# Patient Record
Sex: FEMALE | Race: WHITE | NOT HISPANIC OR LATINO | Employment: FULL TIME | URBAN - METROPOLITAN AREA
[De-identification: names, ages, dates, MRNs, and addresses within clinical notes are randomized per-mention and may not be internally consistent; named-entity substitution may affect disease eponyms.]

---

## 2019-04-22 ENCOUNTER — DOCUMENTATION (OUTPATIENT)
Dept: BEHAVIORAL HEALTH | Facility: CLINIC | Age: 49
End: 2019-04-22

## 2019-04-24 ENCOUNTER — HOSPITAL ENCOUNTER (OUTPATIENT)
Dept: BEHAVIORAL HEALTH | Facility: MEDICAL CENTER | Age: 49
End: 2019-04-24
Attending: PSYCHIATRY & NEUROLOGY
Payer: COMMERCIAL

## 2019-04-24 DIAGNOSIS — F14.980: ICD-10-CM

## 2019-04-24 PROCEDURE — 90791 PSYCH DIAGNOSTIC EVALUATION: CPT

## 2019-04-24 RX ORDER — LEVOTHYROXINE SODIUM 137 UG/1
137 TABLET ORAL
COMMUNITY

## 2019-04-24 RX ORDER — FLUOXETINE HYDROCHLORIDE 20 MG/1
20 CAPSULE ORAL DAILY
COMMUNITY

## 2019-04-24 NOTE — PROGRESS NOTES
I have reviewed the note by Tricia Baker RN and agree with the assessment and treatment plan.    1. Admit to Intensive Outpatient Program on 4/29/19   - Group therapy per schedule,    - Psychoeducational groups per schedule,   - Individual/family counseling sessions with  per treatment plan.  2. Symptoms necessitating Intensive Outpatient Treatment: excessive cocaine use  3. Medical screening/Physical exam per primary care provider or referring facility.
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
## 2019-04-29 ENCOUNTER — HOSPITAL ENCOUNTER (OUTPATIENT)
Dept: BEHAVIORAL HEALTH | Facility: MEDICAL CENTER | Age: 49
End: 2019-04-29
Attending: PSYCHIATRY & NEUROLOGY
Payer: COMMERCIAL

## 2019-04-29 ENCOUNTER — TELEPHONE (OUTPATIENT)
Dept: BEHAVIORAL HEALTH | Facility: MEDICAL CENTER | Age: 49
End: 2019-04-29

## 2019-04-29 DIAGNOSIS — F14.20 COCAINE DEPENDENCE, CONTINUOUS (HCC): ICD-10-CM

## 2019-04-29 DIAGNOSIS — F14.10 COCAINE ABUSE (HCC): ICD-10-CM

## 2019-04-29 PROCEDURE — 90834 PSYTX W PT 45 MINUTES: CPT

## 2019-04-29 PROCEDURE — 90853 GROUP PSYCHOTHERAPY: CPT | Performed by: MARRIAGE & FAMILY THERAPIST

## 2019-04-29 NOTE — BH THERAPY
Group Therapy Checklist  Attendance: Attended  Attendance Duration (min):  (60)  Number of Participants: 11  Program/Group: Intensive Outpatient Program  Topics Covered: Steps 1/2/3  Participation: Limited verbal participation, Attentive  Affect/Mood Range: Flexible  Affect/Mood Display: Congruent w/content  Cognition: Oriented, Alert  Evidence of Imminent Suicide Risk: No  Evidence of imminent homicide risk: No  Therapeutic Interventions: Psychoeducation re: (Comment), Cognitive clarification  Progress Toward Treatment Goal: Moderate improvement

## 2019-04-29 NOTE — BH THERAPY
Group Therapy Checklist  Attendance: Attended  Attendance Duration (min):  (90)  Number of Participants: 11  Program/Group: Intensive Outpatient Program  Topics Covered:  (Process Group )  Participation: Limited verbal participation, Guarded/resistant. Rahul seemed engaged in therapy even though she had limited verbal participation.   Affect/Mood Range: Constricted  Affect/Mood Display: Congruent w/content  Cognition: Alert  Evidence of Imminent Suicide Risk: No  Evidence of imminent homicide risk: No  Therapeutic Interventions: Emotion clarification, Cognitive clarification, Supportive psychotherapy  Progress Toward Treatment Goal: Mild improvement

## 2019-04-29 NOTE — TELEPHONE ENCOUNTER
[facility]  TRANSFER/DISCHARGE SUMMARY FORM    HHPI / SCP:  Other Ins.: Mary     Patient Name: Rahul BENSON  Admission Date: 19  Level of Care Attended:  Intens.OP : 1970  Transfer/Discharge Date: MRN: [id number]  19       SIGNIFICANT FINDINGS/CLINICAL IMPRESSION:   DSM Codes:   Cocaine addiction    ICD10 Codes: F14.10  No diagnosis found.    Additional problems identified via assessment: n/a    Treatment Components in Which Patient Participated (check all that apply):  Education group(s), 1:1 teaching/therapy and Group Therapy    Summary of Course of Treatment: She attended 1 day of IOP and decided she could not afford to take time off work so will not be attending after today.    Condition at Time of Transfer/Discharge: Case administratively closed due to Decided she could not afford to take time off work    [] Medications Reviewed with Copy to Patient    Referred to: Refer to [facility]: Outpatient Therapy     Patient is in agreement with discharge plan: yes    Nneka Chen R.N.

## 2019-04-29 NOTE — CARE PLAN
Problem: Substance Induced Symptoms  Goal: Abstinence    Intervention: Individual Counseling Sessions   [facility]  Therapy Progress Note    Patient Name: Rahul BENSON  Patient MRN: [id number]  Today's Date: 4/29/2019     Type of session:Individual psychotherapy  Length of session: 45 minutes  Persons in attendance:Patient    Subjective/New Info: She forgot to do her assessments so we discussed how she is doing.  She used and drank last week on Wednesday after her intake and got paranoid and felt terrible afterwards.  She is committed to staying clean and sober and has been going almost daily to AA or NA since then.  Her  has also quit drinking and is supporting her and willing to go to meetings with her. Most of her friends drink and use but she has a few that have stopped.  She is having more pain in her back and feet since stopping drugs and is also having a hard time sleeping.  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    Objective/Observations:   Participation: Active verbal participation, Attentive, Engaged and Open to feedback   Grooming: Casual and Neat   Cognition: Alert and Fully Oriented   Eye contact: Good   Mood: Euthymic   Affect: Flexible and Full range   Thought process: Logical and Goal-directed   Speech: Rate within normal limits and Volume within normal limits   Other:     Diagnoses: No diagnosis found. cocaine use disorder    Current risk:   SUICIDE: Not applicable   Homicide: Not applicable   Self-harm: Not applicable   Relapse: Moderate   Other:    Safety Plan reviewed? Not Indicated   If evidence of imminent risk is present, intervention/plan:     Therapeutic Intervention(s): Develop/modify treatment plan, Establish rapport, Goal-setting, Maladaptive behavior addressed, Parenting/familial roles addressed, Positive behavior reinforced, Self-care skills and  "Treatment compliance addressed    Treatment Goal(s)/Objective(s) addressed:  met to discuss her goals and develop a treatment plan     Progress toward Treatment Goals: Mild improvement    Plan:  - \"Homework\" recommendation: complete assessments    Coral SULMA Chen R.N.  4/29/2019                                       "

## 2019-04-30 ENCOUNTER — OFFICE VISIT (OUTPATIENT)
Dept: BEHAVIORAL HEALTH | Facility: CLINIC | Age: 49
End: 2019-04-30
Payer: COMMERCIAL

## 2019-04-30 DIAGNOSIS — F14.11 COCAINE ABUSE, IN REMISSION (HCC): ICD-10-CM

## 2019-04-30 DIAGNOSIS — F41.9 ANXIOUSNESS: ICD-10-CM

## 2019-04-30 DIAGNOSIS — F15.11: ICD-10-CM

## 2019-04-30 DIAGNOSIS — F10.10 ALCOHOL ABUSE: ICD-10-CM

## 2019-04-30 DIAGNOSIS — F33.1 MAJOR DEPRESSIVE DISORDER, RECURRENT EPISODE, MODERATE (HCC): ICD-10-CM

## 2019-04-30 DIAGNOSIS — F12.10 CANNABIS ABUSE: ICD-10-CM

## 2019-04-30 PROCEDURE — 90834 PSYTX W PT 45 MINUTES: CPT | Performed by: PSYCHOLOGIST

## 2019-05-15 PROBLEM — F12.10 CANNABIS ABUSE: Status: ACTIVE | Noted: 2019-05-15

## 2019-05-15 PROBLEM — F41.9 ANXIOUSNESS: Status: ACTIVE | Noted: 2019-05-15

## 2019-05-15 PROBLEM — F14.11 COCAINE ABUSE, IN REMISSION (HCC): Status: ACTIVE | Noted: 2019-05-15

## 2019-05-15 PROBLEM — F10.10 ALCOHOL ABUSE: Status: ACTIVE | Noted: 2019-05-15

## 2019-05-15 PROBLEM — F15.11: Status: ACTIVE | Noted: 2019-05-15

## 2019-05-15 PROBLEM — F33.1 MAJOR DEPRESSIVE DISORDER, RECURRENT EPISODE, MODERATE (HCC): Status: ACTIVE | Noted: 2019-05-15

## 2019-05-15 PROBLEM — F12.11 H/O CANNABIS ABUSE: Status: ACTIVE | Noted: 2019-05-15
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
## 2019-05-16 ENCOUNTER — OFFICE VISIT (OUTPATIENT)
Dept: BEHAVIORAL HEALTH | Facility: CLINIC | Age: 49
End: 2019-05-16
Payer: COMMERCIAL

## 2019-05-16 DIAGNOSIS — F10.10 ALCOHOL ABUSE: ICD-10-CM

## 2019-05-16 DIAGNOSIS — F33.1 MAJOR DEPRESSIVE DISORDER, RECURRENT EPISODE, MODERATE (HCC): ICD-10-CM

## 2019-05-16 DIAGNOSIS — F14.10 COCAINE ABUSE, EPISODIC (HCC): ICD-10-CM

## 2019-05-16 PROCEDURE — 90834 PSYTX W PT 45 MINUTES: CPT | Performed by: PSYCHOLOGIST

## 2019-05-31 PROBLEM — F14.10 COCAINE ABUSE, EPISODIC (HCC): Status: ACTIVE | Noted: 2019-05-15

## 2019-05-31 NOTE — BH THERAPY
[facility]  Therapy Progress Note    Patient Name: Rahul BENSON  Patient MRN: [id number]  Today's Date: 5/16/2019     Type of session:Individual psychotherapy  Length of session: 50 minutes  Persons in attendance:Patient    Subjective/New Info: Patient reports that after hanging out with her friend, who continues to relapse, patient also relapsed on alcohol and cocaine.  Patient is beginning to come to an understanding that she can no longer hang out with friends who use and has decided to put it into this friendship.  Discussed how patient might talk about this with her friend.  Patient identifies cocaine as her main drug of choice but now understands that every time she drinks she relapses on cocaine too or other drugs.  Discussed some relapse prevention strategies that might help patient remain clean and sober including replacement activities.  Patient has a lot of shame and guilt every time she relapses.    Objective/Observations:   Participation: Active verbal participation, Attentive and Engaged   Grooming: Casual   Cognition: Alert and Fully Oriented   Eye contact: Good   Mood: Depressed and Anxious   Affect: Anxious   Thought process: Logical and Goal-directed   Speech: Rate within normal limits   Other:     Diagnoses:   1. Major depressive disorder, recurrent episode, moderate (HCC)    2. Alcohol abuse    3. Cocaine abuse, episodic (HCC)         Current risk:   SUICIDE: Not applicable   Homicide: Not applicable   Self-harm: Not applicable   Relapse: Moderate   Other:    Safety Plan reviewed? Not Indicated   If evidence of imminent risk is present, intervention/plan:     Therapeutic Intervention(s): Behavior:  Behavioral activation and Behavior analysis, Conflict clarification, Maladaptive behavior addressed, Psychoeducation RE: relapse prevention, Self-care skills, Stressors assessed and Supportive psychotherapy    Treatment Goal(s)/Objective(s) addressed: Tx Goals:  - Utilize learned  skills to manage mood and emotional suffering more effectively  - Identify goals/values and cultivate a meaningful life  - Learn to be more emotionally flexible/resilient in times of stress/challenges  - Work an identified program of recovery & relapse prevention  - Successfully learn to regulate impulsive behaviors  - Process and reduce the effects of past trauma on life, functioning, & sense of self  - Learn to ameliorate effects of anxiety on life and functioning  - Increase behaviors of self-compassion and self-care  - Learn to utilize mind/body techniques toward reducing pain experience       Progress toward Treatment Goals: No change    Plan:  - Continue Individual therapy and Medication management    Dorys Jean, Ph.D.  5/16/2019    This dictation has been created using voice recognition software and/or scribes. The accuracy of the dictation is limited by the abilities of the software and the expertise of the scribes. I expect there may be some errors of grammar and possibly content. I made every attempt to manually correct the errors within my dictation. However, errors related to voice recognition software and/or scribes may still exist and should be interpreted within the appropriate context.

## 2019-06-11 ENCOUNTER — APPOINTMENT (OUTPATIENT)
Dept: BEHAVIORAL HEALTH | Facility: CLINIC | Age: 49
End: 2019-06-11
Payer: COMMERCIAL

## 2019-06-25 ENCOUNTER — APPOINTMENT (OUTPATIENT)
Dept: BEHAVIORAL HEALTH | Facility: CLINIC | Age: 49
End: 2019-06-25
Payer: COMMERCIAL

## 2019-07-11 ENCOUNTER — APPOINTMENT (OUTPATIENT)
Dept: BEHAVIORAL HEALTH | Facility: CLINIC | Age: 49
End: 2019-07-11
Payer: COMMERCIAL